# Patient Record
Sex: MALE | Race: WHITE | Employment: FULL TIME | URBAN - METROPOLITAN AREA
[De-identification: names, ages, dates, MRNs, and addresses within clinical notes are randomized per-mention and may not be internally consistent; named-entity substitution may affect disease eponyms.]

---

## 2017-09-14 PROBLEM — I63.9 ACUTE ISCHEMIC STROKE (HCC): Status: ACTIVE | Noted: 2017-09-14

## 2017-09-21 ENCOUNTER — OFFICE VISIT (OUTPATIENT)
Dept: INTERNAL MEDICINE | Age: 52
End: 2017-09-21
Attending: STUDENT IN AN ORGANIZED HEALTH CARE EDUCATION/TRAINING PROGRAM

## 2017-09-21 VITALS
BODY MASS INDEX: 27.88 KG/M2 | HEART RATE: 73 BPM | RESPIRATION RATE: 16 BRPM | SYSTOLIC BLOOD PRESSURE: 180 MMHG | DIASTOLIC BLOOD PRESSURE: 93 MMHG | OXYGEN SATURATION: 99 % | WEIGHT: 188.8 LBS

## 2017-09-21 DIAGNOSIS — I10 ESSENTIAL HYPERTENSION: ICD-10-CM

## 2017-09-21 DIAGNOSIS — E78.00 PURE HYPERCHOLESTEROLEMIA: ICD-10-CM

## 2017-09-21 DIAGNOSIS — I63.9 CEREBROVASCULAR ACCIDENT (CVA), UNSPECIFIED MECHANISM (HCC): Primary | ICD-10-CM

## 2017-09-21 RX ORDER — AMLODIPINE BESYLATE 10 MG/1
10 TABLET ORAL DAILY
Qty: 30 TABLET | Refills: 3 | Status: SHIPPED | OUTPATIENT
Start: 2017-09-21 | End: 2018-01-24 | Stop reason: SDUPTHER

## 2017-09-27 ENCOUNTER — OFFICE VISIT (OUTPATIENT)
Dept: CARDIOLOGY CLINIC | Age: 52
End: 2017-09-27

## 2017-09-27 VITALS
BODY MASS INDEX: 28.09 KG/M2 | SYSTOLIC BLOOD PRESSURE: 132 MMHG | DIASTOLIC BLOOD PRESSURE: 90 MMHG | WEIGHT: 190.2 LBS | HEART RATE: 89 BPM

## 2017-09-27 DIAGNOSIS — I10 ESSENTIAL HYPERTENSION: ICD-10-CM

## 2017-09-27 DIAGNOSIS — I63.9 CEREBROVASCULAR ACCIDENT (CVA), UNSPECIFIED MECHANISM (HCC): ICD-10-CM

## 2017-09-27 DIAGNOSIS — I10 ESSENTIAL HYPERTENSION: Primary | ICD-10-CM

## 2017-09-27 LAB
ANION GAP SERPL CALCULATED.3IONS-SCNC: 17 MMOL/L (ref 3–16)
BUN BLDV-MCNC: 14 MG/DL (ref 7–20)
CALCIUM SERPL-MCNC: 10.5 MG/DL (ref 8.3–10.6)
CHLORIDE BLD-SCNC: 102 MMOL/L (ref 99–110)
CO2: 25 MMOL/L (ref 21–32)
CREAT SERPL-MCNC: 1.2 MG/DL (ref 0.9–1.3)
GFR AFRICAN AMERICAN: >60
GFR NON-AFRICAN AMERICAN: >60
GLUCOSE BLD-MCNC: 98 MG/DL (ref 70–99)
POTASSIUM SERPL-SCNC: 5.3 MMOL/L (ref 3.5–5.1)
SODIUM BLD-SCNC: 144 MMOL/L (ref 136–145)

## 2017-09-27 PROCEDURE — 99214 OFFICE O/P EST MOD 30 MIN: CPT | Performed by: NURSE PRACTITIONER

## 2017-09-27 PROCEDURE — 93000 ELECTROCARDIOGRAM COMPLETE: CPT | Performed by: NURSE PRACTITIONER

## 2017-09-28 DIAGNOSIS — I10 ESSENTIAL HYPERTENSION: Primary | ICD-10-CM

## 2017-10-05 ENCOUNTER — OFFICE VISIT (OUTPATIENT)
Dept: INTERNAL MEDICINE | Age: 52
End: 2017-10-05
Attending: STUDENT IN AN ORGANIZED HEALTH CARE EDUCATION/TRAINING PROGRAM

## 2017-10-05 VITALS
BODY MASS INDEX: 28.41 KG/M2 | SYSTOLIC BLOOD PRESSURE: 114 MMHG | OXYGEN SATURATION: 100 % | DIASTOLIC BLOOD PRESSURE: 78 MMHG | HEART RATE: 82 BPM | WEIGHT: 192.4 LBS | TEMPERATURE: 98.3 F | RESPIRATION RATE: 18 BRPM

## 2017-10-05 DIAGNOSIS — R47.01 EXPRESSIVE APHASIA: ICD-10-CM

## 2017-10-05 DIAGNOSIS — I10 ESSENTIAL HYPERTENSION: ICD-10-CM

## 2017-10-05 DIAGNOSIS — E87.5 HYPERKALEMIA: ICD-10-CM

## 2017-10-05 DIAGNOSIS — I63.512 CEREBRAL INFARCTION DUE TO OCCLUSION OF LEFT MIDDLE CEREBRAL ARTERY (HCC): Primary | ICD-10-CM

## 2017-10-05 LAB
ALBUMIN SERPL-MCNC: 4.2 G/DL (ref 3.4–5)
ANION GAP SERPL CALCULATED.3IONS-SCNC: 11 MMOL/L (ref 3–16)
BUN BLDV-MCNC: 30 MG/DL (ref 7–20)
CALCIUM SERPL-MCNC: 9.5 MG/DL (ref 8.3–10.6)
CHLORIDE BLD-SCNC: 102 MMOL/L (ref 99–110)
CO2: 26 MMOL/L (ref 21–32)
CREAT SERPL-MCNC: 1.5 MG/DL (ref 0.9–1.3)
GFR AFRICAN AMERICAN: 59
GFR NON-AFRICAN AMERICAN: 49
GLUCOSE BLD-MCNC: 109 MG/DL (ref 70–99)
GLUCOSE BLD-MCNC: 119 MG/DL (ref 70–99)
PERFORMED ON: ABNORMAL
PHOSPHORUS: 3.3 MG/DL (ref 2.5–4.9)
POTASSIUM SERPL-SCNC: 4.9 MMOL/L (ref 3.5–5.1)
SODIUM BLD-SCNC: 139 MMOL/L (ref 136–145)

## 2017-10-05 NOTE — MR AVS SNAPSHOT
After Visit Summary             Lora Graham   10/5/2017 8:45 AM   Office Visit    Description:  Male : 1965   Provider:  Diana eLonard MD; [facility]   Department:  [address] [place] [address] and Future Appointments         Below is a list of your follow-up and future appointments. This may not be a complete list as you may have made appointments directly with providers that we are not aware of or your providers may have made some for you. Please call your providers to confirm appointments. It is important to keep your appointments. Please bring your current insurance card, photo ID, co-pay, and all medication bottles to your appointment. If self-pay, payment is expected at the time of service. Your To-Do List     Future Appointments Provider Department Dept Phone    2017 8:45 AM Diana Leonard MD; [address] [phone]         Information from Your Visit        Department     Name Address Phone Fax    [address] [address] SONALI Rogers. [facility] [id number] [phone]      You Were Seen for:         Comments    Cerebral infarction due to occlusion of left middle cerebral artery [facility])   [[id number]]         Vital Signs     Blood Pressure Pulse Temperature Respirations Weight Oxygen Saturation    114/78 (Site: Right Arm, Position: Sitting, Cuff Size: Medium Adult) 82 98.3 °F (36.8 °C) (Oral) 18 192 lb 6.4 oz (87.3 kg) 100%    Body Mass Index Smoking Status                28.41 kg/m2 Never Smoker          Additional Information about your Body Mass Index (BMI)           Your BMI as listed above is considered overweight (25.0-29.9). BMI is an estimate of body fat, calculated from your height and weight.   The higher your BMI, the greater your risk of heart disease, high blood pressure, type 2 diabetes, stroke, gallstones, arthritis, sleep apnea, and certain cancers. BMI is not perfect. It may overestimate body fat in athletes and people who are more muscular. If your body fat is high you can improve your BMI by decreasing your calorie intake and becoming more physically active. Learn more at: [email or web address]          Instructions    Renal panel. Continue all medications the same. Return in 2 months. TO ER BY AMBULANCE              Medications and Orders      Your Current Medications Are              amLODIPine (NORVASC) 10 MG tablet Take 1 tablet by mouth daily    aspirin 81 MG EC tablet Take 1 tablet by mouth daily    atorvastatin (LIPITOR) 40 MG tablet Take 1 tablet by mouth nightly    lisinopril (PRINIVIL;ZESTRIL) 40 MG tablet Take 1 tablet by mouth daily    clopidogrel (PLAVIX) 75 MG tablet Take 1 tablet by mouth daily      Allergies           No Known Allergies      We Ordered/Performed the following           POCT Glucose     RENAL FUNCTION PANEL          Result Summary for RENAL FUNCTION PANEL      Result Information       Status          Abnormal Final result (Collected: 10/5/2017 10:31 AM)           10/5/2017 10:57 AM      Component Results     Component Value Ref Range & Units Status    Sodium 139 136 - 145 mmol/L Final    Potassium 4.9 3.5 - 5.1 mmol/L Final    Specimen hemolysis has exceeded the interference as defined by Roche. Value may be falsely increased. Suggest recollection if clinically  indicated. Chloride 102 99 - 110 mmol/L Final    CO2 26 21 - 32 mmol/L Final    Anion Gap 11 3 - 16 Final    Glucose 109 (H) 70 - 99 mg/dL Final    BUN 30 (H) 7 - 20 mg/dL Final    CREATININE 1.5 (H) 0.9 - 1.3 mg/dL Final    GFR Non- 49 (A) >60 Final    >60 mL/min/1.73m2 EGFR, calc. for ages 25 and older using the  MDRD formula (not corrected for weight), is valid for stable  renal function. GFR  59 (A) >60 Final    Chronic Kidney Disease: less than 60 ml/min/1.73 sq.m. Kidney Failure: less than 15 ml/min/1.73 sq.m. Results valid for patients 18 years and older. Calcium 9.5 8.3 - 10.6 mg/dL Final    Phosphorus 3.3 2.5 - 4.9 mg/dL Final    Alb 4.2 3.4 - 5.0 g/dL Final      Narrative           Performed at: [facility] [employer] [facility]  [address] [facility] [address]   Phone [phone]         Result Summary for POCT Glucose      Result Information       Status          Abnormal Final result (Collected: 10/5/2017  9:29 AM)           10/5/2017  9:31 AM      Component Results     Component Value Ref Range & Units Status    POC Glucose 119 (H) 70 - 99 mg/dl Final    Performed on ACCU-CHEK  Final      Narrative           Performed at: [facility]  [address] [address]   Phone [phone]               Additional Information        Basic Information     Date Of Birth Sex Race Ethnicity Preferred Language    1965 Male White Non-/Non  English      Problem List as of 10/5/2017  Date Reviewed: 9/27/2017                Noncompliance    Acute ischemic stroke [facility])    Hypertensive emergency    Cerebral infarction ([facility] [address]    Slurred speech    Expressive aphasia    HTN (hypertension)    Incoordination of extremity    Leukocytosis      Preventive Care        Date Due    Hepatitis C screening is recommended for all adults regardless of risk factors born between [facility] at least once (lifetime) who have never been tested. 1965    HIV screening is recommended for all people regardless of risk factors  aged 15-65 years at least once (lifetime) who have never been HIV tested.  11/10/1979    Tetanus Combination Vaccine (1 - Tdap) 11/10/1983    Colonoscopy 11/10/2014    Yearly Flu Vaccine (1) 10/4/2018 (Originally 9/1/2017)    Diabetes Screening 9/13/2020    Cholesterol Screening 9/13/2022            [facility] Signup Streamup allows you to send messages to your doctor, view your test results, renew your prescriptions, schedule appointments, view visit notes, and more. How Do I Sign Up? 1. In your Internet browser, go to [email or web address]  2. Click on the Sign Up Now link in the Sign In box. You will see the New Member Sign Up page. 3. Enter your Streamup Access Code exactly as it appears below. You will not need to use this code after youve completed the sign-up process. If you do not sign up before the expiration date, you must request a new code. Streamup Access Code: [email or web address]  Expires: 11/16/2017  1:24 PM    4. Enter your Social Security Number ([id number]) and Date of Birth (mm/dd/yyyy) as indicated and click Submit. You will be taken to the next sign-up page. 5. Create a Streamup ID. This will be your Streamup login ID and cannot be changed, so think of one that is secure and easy to remember. 6. Create a Streamup password. You can change your password at any time. 7. Enter your Password Reset Question and Answer. This can be used at a later time if you forget your password. 8. Enter your e-mail address. You will receive e-mail notification when new information is available in [address]. 9. Click Sign Up. You can now view your medical record. Additional Information  If you have questions, please contact the physician practice where you receive care. Remember, Streamup is NOT to be used for urgent needs. For medical emergencies, dial 911. For questions regarding your Streamup account call [phone]. If you have a clinical question, please call your doctor's office.

## 2017-10-09 ENCOUNTER — OFFICE VISIT (OUTPATIENT)
Dept: INTERNAL MEDICINE | Age: 52
End: 2017-10-09
Attending: STUDENT IN AN ORGANIZED HEALTH CARE EDUCATION/TRAINING PROGRAM

## 2017-10-09 VITALS
OXYGEN SATURATION: 98 % | SYSTOLIC BLOOD PRESSURE: 131 MMHG | TEMPERATURE: 98.7 F | HEIGHT: 69 IN | DIASTOLIC BLOOD PRESSURE: 86 MMHG | RESPIRATION RATE: 16 BRPM | HEART RATE: 82 BPM | BODY MASS INDEX: 28.73 KG/M2 | WEIGHT: 194 LBS

## 2017-10-09 DIAGNOSIS — N17.9 AKI (ACUTE KIDNEY INJURY) (HCC): Primary | ICD-10-CM

## 2017-10-09 LAB
ALBUMIN SERPL-MCNC: 4.3 G/DL (ref 3.4–5)
ANION GAP SERPL CALCULATED.3IONS-SCNC: 12 MMOL/L (ref 3–16)
BUN BLDV-MCNC: 14 MG/DL (ref 7–20)
CALCIUM SERPL-MCNC: 9.8 MG/DL (ref 8.3–10.6)
CHLORIDE BLD-SCNC: 99 MMOL/L (ref 99–110)
CO2: 27 MMOL/L (ref 21–32)
CREAT SERPL-MCNC: 1.2 MG/DL (ref 0.9–1.3)
GFR AFRICAN AMERICAN: >60
GFR NON-AFRICAN AMERICAN: >60
GLUCOSE BLD-MCNC: 78 MG/DL (ref 70–99)
PHOSPHORUS: 2.9 MG/DL (ref 2.5–4.9)
POTASSIUM SERPL-SCNC: 4 MMOL/L (ref 3.5–5.1)
SODIUM BLD-SCNC: 138 MMOL/L (ref 136–145)

## 2017-10-09 NOTE — PROGRESS NOTES
[facility] PROGRESS NOTE      October 9, 2017  Valentino Lye    Chief Complaint:   Chief Complaint   Patient presents with    Follow-up     B/P check       Constitutional: alert and oriented; calm; states he feels well since receiving fluids in hospital last week; states he has needle phobia and passed out while having blood drawn last week. Eyes: negative  Ears, nose, mouth, throat, and face: negative  Respiratory: negative  Cardiovascular: denies feeling lightheaded or dizzy with position changes  Gastrointestinal: negative  States he has been eating and drinking well.   Genitourinary: negative  Integument/breast: negative  Hematologic/lymphatic: negative  Musculoskeletal: negative  Neurological: negative  Diabetes: No      Pain Assessment:  Pain Present: no  Pain Score: 0  Pain Quality/Description: no c/o pain    Mobility/Safety/Self-Care:  Steady Gait: Yes  History of Falls: passed out last week during phlebotomy   History of a Fall within the last 30 days No  Assistive Device: None  Poor Hygiene: No    Psychosocial:   Depression: No  If YES,    Does Patient express current thoughts of harming self or others?: No  Anxious: No  Insomnia: No  Inappropriate Behavior: No  Alcohol Abuse: no  Substance Abuse: unknown  Signs of Physical Abuse: No  Signs of Emotional Abuse: No    Educational:  Identify the learner who is being assessed for education:  patient                    Ability to Learn:  Exhibits ability to grasp concepts and respond to questions: High  Ready to Learn: Yes  calm   Preferred Method of Learning:  written  Barriers to Learning: Verbalizes interest  Special Considerations due to cultural, Gnosticist, spiritual beliefs:  No  Language:  English  :  No    Note:   wife with patient      9:52 AM 10/9/2017

## 2017-10-09 NOTE — PATIENT INSTRUCTIONS
Call your pharmacy for medication refills at least 48 hours ahead at [phone] (Monday -Friday, 08:00 AM to 4:00 PM .If you become ill when the clinic is closed, please call [facility]  at [phone] and ask the  to page the AOD between 6:00 AM and 6:00 PM or page the AON between 6:00 PM & 6:00 AM.    The clinic is not able to process [facility] requests for appointments or messages including refill requests. Please call the [facility] [employer] [facility] [address] [employer] at [phone] for appointments and messages. The [facility] [employer] [facility] [address]  Telephone:[phone]    Renal function bloodwork done today. New prescription for Beta blocker:     Metoprolol      Do not fill or start this medication until  calls you with the results of the bloodwork. Also do not take the Lisinopril until you know the results of the bloodwork. Return next week to see     Instructions reviewed with patient and copy given to patient upon discharge.      10:39 AM10/9/2017

## 2017-10-09 NOTE — PROGRESS NOTES
Outpatient Clinic Visit Note    Patient: Lisa Garcia  : 1965 (45 y.o.)  Date: 10/9/2017    CC: Syncope    HPI:    Mr Rosas Aragon is a 46 yo gentleman is following up after experncing what appears to be a convulsive vasovagal episode last week in clinic. Initially some concern for seizure and was sent to ED. He was seen in the ED and was discharged after being rehydrated (2L IV of NS), other than elevated creatinine no acute findings were noted. CT head was negative for acute changes. Past Medical History:    Past Medical History:   Diagnosis Date    Back pain     lower back. pinched nerve and \"slipped disc\"    Cerebral artery occlusion with cerebral infarction ([address]     Hypertension        Past Surgical History:  Past Surgical History:   Procedure Laterality Date    FRACTURE SURGERY      left arm       Home Medications:  Has been reviewed and as documented. Allergies:    Review of patient's allergies indicates no known allergies. Family History:   No family history on file. ROS: A 10-organ Review Of Systems was obtained and otherwise unremarkable except as per HPI. PHYSICAL EXAM:  /86 (Site: Right Arm, Position: Standing, Cuff Size: Large Adult) Comment: denies lightheadedness  Pulse 82   Temp 98.7 °F (37.1 °C) (Oral)   Resp 16   Ht 5' 9\" (1.753 m)   Wt 194 lb (88 kg)   SpO2 98%   BMI 28.65 kg/m²      Physical Exam  General: Patient conversant, appears comfortable, no acute distress  HEENT: PERRL; EOMI; moist mucous membranes  Heart: RRR; Normal S1, S2; no murmurs, rubs, or gallops  Lungs: CTAB; no wheezing or crackles; normal respiratory effort  Abdomen: Soft; non-tender; non-distended. MSK: Muscle strength grossly intact.    Vascular: Radial and DP pulses 2+ and symmetric  Skin: No rash, No jaundice  NEURO:   CNs II-XII grossly intact without obvious deficit   Somewhat slowed speech, but fluent or word findings issues   Slow gait   Brisk (2+) reflexes elicited biceps brachialis, triceps, and patellar without clonus   Sensation grossly intact to light touch   Minimal dysmetria more pronounced in left hand   Muscle strength 5/5 in all extremities    Assessment & Plan:      Convulsive Vasovagal episode. Last week while getting blood drawn became hypotensive and bradycardic. Also experienced some shaking. Send to ED and noted to be also dehydrated with elevated creatinine. Reportedly has previous history of vasovagal episodes with \"needles\". Seizure less likely given findings. Glucose was WNL. - Repeat BP  - Repeat Renal Panel     #S/P CVA  - Cont Plavix, ASA, Statin for now. Per discharge paperwork Plavix should be continued for 30 days.   - Aggressive risk modification  - Handicap placard    #Essential Hypertension. 153/90 lying, 134/78 sitting, 131/86 standing. Home blood pressure measurement also range from low 100s to 150s. Denies lightheadedness, tachycardia or dizziness when changing position.  - Hold Lisinopril as previously had POC Cr elevation to 1.7 in ED suggestive of MAYUR. Re-check renal panel, restart if back to baseline creatinine. Will switch to metoprolol 25 BID if creatinine still elevated.    - Cont Norvasc 10mg  - Has f/u with nephrology scheduled in November for secondary HTN w/u, HTN management - appreciate their input  - Cont to check BP at home and bring it o all appointments    #Hyperlipidemia  Cont Lipitor    #Hyperkalemia - resolved  In ED POC venous 4.1 in ED, renal panel from 10/5 was 4.9 (but hemolyzed)  - Will recheck renal today     #Health Maintenance  Will need colonoscopy, Hep C screen, HIV screen, possible Tdap moving forward  F/u with PCP next available appointment    Dispo: Pt has been staffed with Dr. Susana Hutchison  _______________  Salvador Matson MD 10/9/2017 10:21 AM   PGY2 Internal Medicine  Pager: [phone]

## 2017-10-10 ENCOUNTER — TELEPHONE (OUTPATIENT)
Dept: INTERNAL MEDICINE | Age: 52
End: 2017-10-10

## 2017-10-10 NOTE — TELEPHONE ENCOUNTER
Spoke with patient's girlfriend, Gandarahever Romero. Patient to restart Lisinopril. Patient to told betablocker. Juliocesar Romero understands instructions.

## 2018-01-24 DIAGNOSIS — I63.9 CEREBROVASCULAR ACCIDENT (CVA), UNSPECIFIED MECHANISM (HCC): ICD-10-CM

## 2018-01-24 DIAGNOSIS — I10 ESSENTIAL HYPERTENSION: ICD-10-CM

## 2018-01-24 RX ORDER — AMLODIPINE BESYLATE 10 MG/1
10 TABLET ORAL DAILY
Qty: 30 TABLET | Refills: 0 | Status: ON HOLD | OUTPATIENT
Start: 2018-01-24 | End: 2022-04-19

## 2022-04-18 ENCOUNTER — APPOINTMENT (OUTPATIENT)
Dept: MRI IMAGING | Age: 57
DRG: 149 | End: 2022-04-18

## 2022-04-18 ENCOUNTER — HOSPITAL ENCOUNTER (INPATIENT)
Age: 57
LOS: 1 days | Discharge: HOME | DRG: 149 | End: 2022-04-19
Attending: EMERGENCY MEDICINE | Admitting: INTERNAL MEDICINE

## 2022-04-18 ENCOUNTER — APPOINTMENT (OUTPATIENT)
Dept: CT IMAGING | Age: 57
DRG: 149 | End: 2022-04-18

## 2022-04-18 DIAGNOSIS — N17.9 AKI (ACUTE KIDNEY INJURY) (HCC): ICD-10-CM

## 2022-04-18 DIAGNOSIS — I10 ESSENTIAL HYPERTENSION: ICD-10-CM

## 2022-04-18 DIAGNOSIS — R42 VERTIGO: Primary | ICD-10-CM

## 2022-04-18 DIAGNOSIS — I10 UNCONTROLLED HYPERTENSION: ICD-10-CM

## 2022-04-18 DIAGNOSIS — I63.9 CEREBROVASCULAR ACCIDENT (CVA), UNSPECIFIED MECHANISM (HCC): ICD-10-CM

## 2022-04-18 LAB
ANION GAP SERPL CALCULATED.3IONS-SCNC: 7 MMOL/L (ref 3–16)
BASOPHILS ABSOLUTE: 0.1 K/UL (ref 0–0.2)
BASOPHILS RELATIVE PERCENT: 1 %
BILIRUBIN URINE: NEGATIVE
BLOOD, URINE: ABNORMAL
BUN BLDV-MCNC: 18 MG/DL (ref 7–20)
CALCIUM SERPL-MCNC: 10.1 MG/DL (ref 8.3–10.6)
CHLORIDE BLD-SCNC: 101 MMOL/L (ref 99–110)
CLARITY: CLEAR
CO2: 25 MMOL/L (ref 21–32)
COLOR: YELLOW
CREAT SERPL-MCNC: 1.8 MG/DL (ref 0.9–1.3)
EKG ATRIAL RATE: 53 BPM
EKG DIAGNOSIS: NORMAL
EKG P AXIS: 39 DEGREES
EKG P-R INTERVAL: 150 MS
EKG Q-T INTERVAL: 448 MS
EKG QRS DURATION: 86 MS
EKG QTC CALCULATION (BAZETT): 420 MS
EKG R AXIS: 36 DEGREES
EKG T AXIS: -44 DEGREES
EKG VENTRICULAR RATE: 53 BPM
EOSINOPHILS ABSOLUTE: 0.4 K/UL (ref 0–0.6)
EOSINOPHILS RELATIVE PERCENT: 3.7 %
EPITHELIAL CELLS, UA: ABNORMAL /HPF (ref 0–5)
GFR AFRICAN AMERICAN: 47
GFR NON-AFRICAN AMERICAN: 39
GLUCOSE BLD-MCNC: 117 MG/DL (ref 70–99)
GLUCOSE URINE: NEGATIVE MG/DL
HCT VFR BLD CALC: 43.8 % (ref 40.5–52.5)
HEMOGLOBIN: 14.5 G/DL (ref 13.5–17.5)
HYALINE CASTS: ABNORMAL /LPF (ref 0–2)
KETONES, URINE: NEGATIVE MG/DL
LEUKOCYTE ESTERASE, URINE: NEGATIVE
LYMPHOCYTES ABSOLUTE: 2.1 K/UL (ref 1–5.1)
LYMPHOCYTES RELATIVE PERCENT: 21.9 %
MCH RBC QN AUTO: 28.5 PG (ref 26–34)
MCHC RBC AUTO-ENTMCNC: 33 G/DL (ref 31–36)
MCV RBC AUTO: 86.4 FL (ref 80–100)
MICROSCOPIC EXAMINATION: YES
MONOCYTES ABSOLUTE: 0.6 K/UL (ref 0–1.3)
MONOCYTES RELATIVE PERCENT: 6.3 %
MUCUS: ABNORMAL /LPF
NEUTROPHILS ABSOLUTE: 6.5 K/UL (ref 1.7–7.7)
NEUTROPHILS RELATIVE PERCENT: 67.1 %
NITRITE, URINE: NEGATIVE
PDW BLD-RTO: 14.2 % (ref 12.4–15.4)
PH UA: 6 (ref 5–8)
PLATELET # BLD: 193 K/UL (ref 135–450)
PMV BLD AUTO: 11.2 FL (ref 5–10.5)
POTASSIUM REFLEX MAGNESIUM: 4.3 MMOL/L (ref 3.5–5.1)
PROTEIN UA: 100 MG/DL
RBC # BLD: 5.07 M/UL (ref 4.2–5.9)
RBC UA: ABNORMAL /HPF (ref 0–4)
SODIUM BLD-SCNC: 133 MMOL/L (ref 136–145)
SODIUM URINE: 116 MMOL/L
SPECIFIC GRAVITY UA: >=1.03 (ref 1–1.03)
URINE REFLEX TO CULTURE: YES
URINE TYPE: ABNORMAL
UROBILINOGEN, URINE: 0.2 E.U./DL
WBC # BLD: 9.7 K/UL (ref 4–11)
WBC UA: ABNORMAL /HPF (ref 0–5)

## 2022-04-18 PROCEDURE — 2580000003 HC RX 258

## 2022-04-18 PROCEDURE — 6370000000 HC RX 637 (ALT 250 FOR IP): Performed by: EMERGENCY MEDICINE

## 2022-04-18 PROCEDURE — 85025 COMPLETE CBC W/AUTO DIFF WBC: CPT

## 2022-04-18 PROCEDURE — 93005 ELECTROCARDIOGRAM TRACING: CPT | Performed by: EMERGENCY MEDICINE

## 2022-04-18 PROCEDURE — 70450 CT HEAD/BRAIN W/O DYE: CPT

## 2022-04-18 PROCEDURE — 82570 ASSAY OF URINE CREATININE: CPT

## 2022-04-18 PROCEDURE — 80048 BASIC METABOLIC PNL TOTAL CA: CPT

## 2022-04-18 PROCEDURE — 1200000000 HC SEMI PRIVATE

## 2022-04-18 PROCEDURE — 81001 URINALYSIS AUTO W/SCOPE: CPT

## 2022-04-18 PROCEDURE — 70551 MRI BRAIN STEM W/O DYE: CPT

## 2022-04-18 PROCEDURE — 36415 COLL VENOUS BLD VENIPUNCTURE: CPT

## 2022-04-18 PROCEDURE — 6370000000 HC RX 637 (ALT 250 FOR IP)

## 2022-04-18 PROCEDURE — 99284 EMERGENCY DEPT VISIT MOD MDM: CPT

## 2022-04-18 PROCEDURE — 84300 ASSAY OF URINE SODIUM: CPT

## 2022-04-18 PROCEDURE — 6360000002 HC RX W HCPCS

## 2022-04-18 RX ORDER — MECLIZINE HYDROCHLORIDE 25 MG/1
25 TABLET ORAL ONCE
Status: COMPLETED | OUTPATIENT
Start: 2022-04-18 | End: 2022-04-18

## 2022-04-18 RX ORDER — NIFEDIPINE 30 MG/1
30 TABLET, FILM COATED, EXTENDED RELEASE ORAL 3 TIMES DAILY
COMMUNITY
Start: 2022-02-11

## 2022-04-18 RX ORDER — ACETAMINOPHEN 325 MG/1
650 TABLET ORAL EVERY 6 HOURS PRN
Status: DISCONTINUED | OUTPATIENT
Start: 2022-04-18 | End: 2022-04-19 | Stop reason: HOSPADM

## 2022-04-18 RX ORDER — ACETAMINOPHEN 650 MG/1
650 SUPPOSITORY RECTAL EVERY 6 HOURS PRN
Status: DISCONTINUED | OUTPATIENT
Start: 2022-04-18 | End: 2022-04-19 | Stop reason: HOSPADM

## 2022-04-18 RX ORDER — METOPROLOL SUCCINATE 50 MG/1
50 TABLET, EXTENDED RELEASE ORAL DAILY
Status: CANCELLED | OUTPATIENT
Start: 2022-04-18

## 2022-04-18 RX ORDER — HEPARIN SODIUM 5000 [USP'U]/ML
5000 INJECTION, SOLUTION INTRAVENOUS; SUBCUTANEOUS EVERY 8 HOURS SCHEDULED
Status: DISCONTINUED | OUTPATIENT
Start: 2022-04-18 | End: 2022-04-19 | Stop reason: HOSPADM

## 2022-04-18 RX ORDER — ONDANSETRON 2 MG/ML
4 INJECTION INTRAMUSCULAR; INTRAVENOUS EVERY 6 HOURS PRN
Status: DISCONTINUED | OUTPATIENT
Start: 2022-04-18 | End: 2022-04-19 | Stop reason: HOSPADM

## 2022-04-18 RX ORDER — SODIUM CHLORIDE 9 MG/ML
INJECTION, SOLUTION INTRAVENOUS PRN
Status: DISCONTINUED | OUTPATIENT
Start: 2022-04-18 | End: 2022-04-19 | Stop reason: HOSPADM

## 2022-04-18 RX ORDER — SODIUM CHLORIDE 0.9 % (FLUSH) 0.9 %
5-40 SYRINGE (ML) INJECTION EVERY 12 HOURS SCHEDULED
Status: DISCONTINUED | OUTPATIENT
Start: 2022-04-18 | End: 2022-04-19 | Stop reason: HOSPADM

## 2022-04-18 RX ORDER — NEBIVOLOL 10 MG/1
10 TABLET ORAL DAILY
COMMUNITY
Start: 2022-02-11

## 2022-04-18 RX ORDER — POLYETHYLENE GLYCOL 3350 17 G/17G
17 POWDER, FOR SOLUTION ORAL DAILY PRN
Status: DISCONTINUED | OUTPATIENT
Start: 2022-04-18 | End: 2022-04-19 | Stop reason: HOSPADM

## 2022-04-18 RX ORDER — SODIUM CHLORIDE 0.9 % (FLUSH) 0.9 %
5-40 SYRINGE (ML) INJECTION PRN
Status: DISCONTINUED | OUTPATIENT
Start: 2022-04-18 | End: 2022-04-19 | Stop reason: HOSPADM

## 2022-04-18 RX ORDER — PROMETHAZINE HYDROCHLORIDE 12.5 MG/1
12.5 TABLET ORAL EVERY 6 HOURS PRN
Status: DISCONTINUED | OUTPATIENT
Start: 2022-04-18 | End: 2022-04-19 | Stop reason: HOSPADM

## 2022-04-18 RX ORDER — SPIRONOLACTONE 50 MG/1
50 TABLET, FILM COATED ORAL DAILY
Status: DISCONTINUED | OUTPATIENT
Start: 2022-04-18 | End: 2022-04-19 | Stop reason: HOSPADM

## 2022-04-18 RX ORDER — NIFEDIPINE 30 MG/1
90 TABLET, FILM COATED, EXTENDED RELEASE ORAL DAILY
Status: DISCONTINUED | OUTPATIENT
Start: 2022-04-18 | End: 2022-04-19 | Stop reason: HOSPADM

## 2022-04-18 RX ORDER — ATORVASTATIN CALCIUM 40 MG/1
40 TABLET, FILM COATED ORAL NIGHTLY
Status: DISCONTINUED | OUTPATIENT
Start: 2022-04-18 | End: 2022-04-19 | Stop reason: HOSPADM

## 2022-04-18 RX ORDER — ASPIRIN 81 MG/1
81 TABLET ORAL DAILY
Status: DISCONTINUED | OUTPATIENT
Start: 2022-04-18 | End: 2022-04-19 | Stop reason: HOSPADM

## 2022-04-18 RX ORDER — ROSUVASTATIN CALCIUM 10 MG/1
10 TABLET, COATED ORAL DAILY
Status: ON HOLD | COMMUNITY
Start: 2022-02-11 | End: 2022-04-19

## 2022-04-18 RX ADMIN — ATORVASTATIN CALCIUM 40 MG: 40 TABLET, FILM COATED ORAL at 23:12

## 2022-04-18 RX ADMIN — NIFEDIPINE 90 MG: 30 TABLET, EXTENDED RELEASE ORAL at 15:33

## 2022-04-18 RX ADMIN — HEPARIN SODIUM 5000 UNITS: 5000 INJECTION INTRAVENOUS; SUBCUTANEOUS at 17:15

## 2022-04-18 RX ADMIN — SODIUM CHLORIDE, PRESERVATIVE FREE 10 ML: 5 INJECTION INTRAVENOUS at 22:52

## 2022-04-18 RX ADMIN — MECLIZINE HYDROCHLORIDE 25 MG: 25 TABLET ORAL at 10:23

## 2022-04-18 RX ADMIN — ASPIRIN 81 MG: 81 TABLET, COATED ORAL at 15:33

## 2022-04-18 ASSESSMENT — PAIN SCALES - GENERAL
PAINLEVEL_OUTOF10: 0

## 2022-04-18 ASSESSMENT — ENCOUNTER SYMPTOMS
VOMITING: 0
ABDOMINAL PAIN: 0
NAUSEA: 1
DIARRHEA: 0
SHORTNESS OF BREATH: 0

## 2022-04-18 NOTE — ED PROVIDER NOTES
[address] [facility]          ATTENDING PHYSICIAN NOTE       Date of evaluation: 4/18/2022    Chief Complaint     Dizziness and Nausea      History of Present Illness     Ap Griffin is a 61 y.o. male who presents with complaints of waking up this morning with a sensation that the room was spinning. The patient had a hard time ambulating and became nauseous as well. He denies any headache or visual changes. He is concerned because he has a history of prior strokes and thought that this may represent a symptom of a stroke. With the patient's prior strokes, he had a visual disturbance as well as speech disturbance. The patient states that the vertigo sensation is actually getting better as time goes on today. He denies any chest pain or palpitations. The patient also notes that his blood pressure has been up since waking up this morning as well and he had some changes made to his blood pressure regimen about 3 weeks ago at his primary care physician's office. He reports now that he is only taking 1 medication but there are multiple other medications listed in his chart. He appears to have been on aspirin, Plavix, and a statin in the past but has not reporting that he is taking these medications. He is only taking one of his blood pressure medications as well. Review of Systems     Review of Systems   Constitutional: Negative for chills and fever. Eyes: Negative for visual disturbance. Respiratory: Negative for shortness of breath. Cardiovascular: Negative for chest pain and palpitations. Gastrointestinal: Positive for nausea. Negative for abdominal pain, diarrhea and vomiting. Neurological: Positive for dizziness. Negative for syncope, speech difficulty, weakness, light-headedness, numbness and headaches. All other systems reviewed and are negative.       Past Medical, Surgical, Family, and Social History     He has a past medical history of Back pain, Cerebral artery occlusion with cerebral infarction ([facility] [address] CKD (chronic kidney disease) stage 3, GFR 30-59 ml/min ([facility]), Hyperlipidemia, mixed, and Hypertension. He has a past surgical history that includes fracture surgery and Colonoscopy (10/2010). His family history includes Cancer in his father and mother; Hypertension in his brother, father, mother, and sister. He reports that he has never smoked. He has never used smokeless tobacco. He reports that he does not drink alcohol and does not use drugs. Medications     Previous Medications    AMLODIPINE (NORVASC) 10 MG TABLET    Take 1 tablet by mouth daily Must be seen before we will dispense more refills. ASPIRIN 81 MG EC TABLET    Take 1 tablet by mouth daily    ATORVASTATIN (LIPITOR) 40 MG TABLET    Take 1 tablet by mouth nightly    CLOPIDOGREL (PLAVIX) 75 MG TABLET    Take 1 tablet by mouth daily    LISINOPRIL (PRINIVIL;ZESTRIL) 40 MG TABLET    Take 1 tablet by mouth daily    NEBIVOLOL (BYSTOLIC) 10 MG TABLET    Take 10 mg by mouth daily    NIFEDIPINE (ADALAT CC) 30 MG EXTENDED RELEASE TABLET    Take 30 mg by mouth 3 times daily    ROSUVASTATIN (CRESTOR) 10 MG TABLET    Take 10 mg by mouth daily       Allergies     He has No Known Allergies. Physical Exam     INITIAL VITALS: BP: (!) 186/102, Temp: 97.9 °F (36.6 °C), Pulse: 61, Resp: 21, SpO2: 99 %   Physical Exam  Vitals and nursing note reviewed. Constitutional:       General: He is not in acute distress. Appearance: He is well-developed. He is not diaphoretic. HENT:      Head: Normocephalic. Eyes:      Extraocular Movements: Extraocular movements intact. Right eye: No nystagmus. Left eye: No nystagmus. Pupils: Pupils are equal, round, and reactive to light. Cardiovascular:      Rate and Rhythm: Normal rate and regular rhythm. Pulmonary:      Effort: Pulmonary effort is normal.      Breath sounds: Normal breath sounds.    Abdominal:      General: Bowel sounds are normal. There is no distension. Palpations: Abdomen is soft. Tenderness: There is no abdominal tenderness. Skin:     General: Skin is warm and dry. Neurological:      Mental Status: He is alert and oriented to person, place, and time. Cranial Nerves: No cranial nerve deficit. Sensory: No sensory deficit. Motor: No weakness. Coordination: Coordination normal.      Gait: Gait abnormal.   Psychiatric:         Behavior: Behavior normal.         Diagnostic Results     EKG   Interpreted by Yolette Haji MD     Rhythm: normal sinus   Rate: normal  Axis: normal  Ectopy: none  Conduction: normal  ST Segments: no acute change  T Waves: Inversions inferiorly and laterally, known to be old  Q Waves: none    Clinical Impression: normal sinus rhythm with no acute changes, unspecific T wave inversion      RADIOLOGY:  CT HEAD WO CONTRAST   Final Result      1. No acute intracranial abnormality. 2. Extensive nonspecific periventricular white matter hypodensity compatible with chronic small vessel ischemic disease and/or age related degenerative change. 3. Bilateral thalamic hypodensities compatible with remote lacunar infarcts. 4. Large periapical lucency along the left maxillary incisor suggesting a large chronic periapical abscess. Similar findings present on previous exam from 2017.           LABS:   Results for orders placed or performed during the hospital encounter of 04/18/22   CBC with Auto Differential   Result Value Ref Range    WBC 9.7 4.0 - 11.0 K/uL    RBC 5.07 4.20 - 5.90 M/uL    Hemoglobin 14.5 13.5 - 17.5 g/dL    Hematocrit 43.8 40.5 - 52.5 %    MCV 86.4 80.0 - 100.0 fL    MCH 28.5 26.0 - 34.0 pg    MCHC 33.0 31.0 - 36.0 g/dL    RDW 14.2 12.4 - 15.4 %    Platelets 463 888 - 913 K/uL    MPV 11.2 (H) 5.0 - 10.5 fL    Neutrophils % 67.1 %    Lymphocytes % 21.9 %    Monocytes % 6.3 %    Eosinophils % 3.7 %    Basophils % 1.0 %    Neutrophils Absolute 6.5 1.7 - 7.7 K/uL    Lymphocytes Absolute 2.1 1.0 - 5.1 K/uL    Monocytes Absolute 0.6 0.0 - 1.3 K/uL    Eosinophils Absolute 0.4 0.0 - 0.6 K/uL    Basophils Absolute 0.1 0.0 - 0.2 K/uL   Basic Metabolic Panel w/ Reflex to MG   Result Value Ref Range    Sodium 133 (L) 136 - 145 mmol/L    Potassium reflex Magnesium 4.3 3.5 - 5.1 mmol/L    Chloride 101 99 - 110 mmol/L    CO2 25 21 - 32 mmol/L    Anion Gap 7 3 - 16    Glucose 117 (H) 70 - 99 mg/dL    BUN 18 7 - 20 mg/dL    CREATININE 1.8 (H) 0.9 - 1.3 mg/dL    GFR Non-African American 39 (A) >60    GFR  47 (A) >60    Calcium 10.1 8.3 - 10.6 mg/dL   EKG 12 Lead   Result Value Ref Range    Ventricular Rate 53 BPM    Atrial Rate 53 BPM    P-R Interval 150 ms    QRS Duration 86 ms    Q-T Interval 448 ms    QTc Calculation (Bazett) 420 ms    P Axis 39 degrees    R Axis 36 degrees    T Axis -44 degrees    Diagnosis       EKG performed in ER and to be interpreted by ER physician. Confirmed by MD, ER ([id number]),  Dannielle Espinoza ([id number]) on 4/18/2022 11:43:27 AM       RECENT VITALS:  BP: (!) 181/106,Temp: 97.9 °F (36.6 °C), Pulse: 55, Resp: 18, SpO2: (!) 88 %       ED Course     Nursing Notes, Past Medical Hx, Past Surgical Hx, Social Hx,Allergies, and Family Hx were reviewed. patient was given the following medications:  Orders Placed This Encounter   Medications    meclizine (ANTIVERT) tablet 25 mg       CONSULTS:  IP CONSULT TO HOSPITALIST    MEDICAL DECISIONMAKING / ASSESSMENT / Amy Settler is a 61 y.o. male presenting with acute onset of vertigo this morning upon awakening. Symptoms have gotten better over the course of the morning. He had no other concerning neurologic symptoms however given his prior history of stroke and the fact that he is not on any stroke prevention medications, a CT of the head was performed which showed no acute findings.   He was noted to be hypertensive here as well and this is likely due to the way he is taking his medications which sound to be not as they were prescribed. He was found to have MAYUR on lab analysis which is new for him as well and for these reasons I feel he needs to be brought in the hospital for further management of his blood pressure, treatment of vertigo, and treatment of MAYUR. Clinical Impression     1. Vertigo    2. MAYUR (acute kidney injury) ([facility] [address]    3.  Uncontrolled hypertension        Disposition     DISPOSITION Admitted 04/18/2022 12:54:14 PM       Jenny Medina MD  04/18/22 [id number]

## 2022-04-18 NOTE — H&P
further workup and management of MAYUR on CKD. Past Medical History:        Diagnosis Date    Back pain     lower back. pinched nerve and \"slipped disc\"    Cerebral artery occlusion with cerebral infarction ([facility] [address] 09/2017    left hemipons and posterior left basal ganglia    CKD (chronic kidney disease) stage 3, GFR 30-59 ml/min (HCC)     Hyperlipidemia, mixed     Hypertension        Past Surgical History:        Procedure Laterality Date    COLONOSCOPY  10/2010    Dr An Solano      left arm       Medications Priorto Admission:    Not in a hospital admission. Allergies:  Patient has no known allergies. Social History:    reports that he has never smoked. He has never used smokeless tobacco. He reports that he does not drink alcohol and does not use drugs. Family History:       Problem Relation Age of Onset    Cancer Mother         Colon    Hypertension Mother     Cancer Father         Lymphoma    Hypertension Father     Hypertension Sister     Hypertension Brother        Review of Systems    ROS: A 10 point review of systems was conducted, significant findings as noted in HPI. Physical exam:      Vitals:    04/18/22 1230   BP: (!) 181/106   Pulse: 55   Resp: 18   Temp:    SpO2: (!) 88%       Physical Exam  Constitutional:       Appearance: Normal appearance. He is obese. HENT:      Head: Normocephalic. Mouth/Throat:      Mouth: Mucous membranes are moist.   Eyes:      Extraocular Movements: Extraocular movements intact. Conjunctiva/sclera: Conjunctivae normal.      Pupils: Pupils are equal, round, and reactive to light. Cardiovascular:      Rate and Rhythm: Normal rate and regular rhythm. Pulses: Normal pulses. Heart sounds: Normal heart sounds. Pulmonary:      Effort: Pulmonary effort is normal.      Breath sounds: Normal breath sounds. Abdominal:      General: Abdomen is flat. Bowel sounds are normal.      Palpations: Abdomen is soft. Musculoskeletal:         General: Normal range of motion. Cervical back: Normal range of motion. Neurological:      General: No focal deficit present. Mental Status: He is alert and oriented to person, place, and time. Mental status is at baseline. Psychiatric:         Mood and Affect: Mood normal.         Behavior: Behavior normal.         DATA:    Labs:  CBC:   Recent Labs     04/18/22  1025   WBC 9.7   HGB 14.5   HCT 43.8          BMP:   Recent Labs     04/18/22  1025   *   K 4.3      CO2 25   BUN 18   CREATININE 1.8*   GLUCOSE 117*     LFT's: No results for input(s): AST, ALT, ALB, BILITOT, ALKPHOS in the last 72 hours. Troponin: No results for input(s): TROPONINI in the last 72 hours. BNP:No results for input(s): BNP in the last 72 hours. ABGs: No results for input(s): PHART, SSI9MAB, PO2ART in the last 72 hours. INR: No results for input(s): INR in the last 72 hours. U/A:No results for input(s): NITRITE, COLORU, PHUR, LABCAST, WBCUA, RBCUA, MUCUS, TRICHOMONAS, YEAST, BACTERIA, CLARITYU, SPECGRAV, LEUKOCYTESUR, UROBILINOGEN, BILIRUBINUR, BLOODU, GLUCOSEU, AMORPHOUS in the last 72 hours. Invalid input(s): KETONESU    CT HEAD WO CONTRAST   Final Result      1. No acute intracranial abnormality. 2. Extensive nonspecific periventricular white matter hypodensity compatible with chronic small vessel ischemic disease and/or age related degenerative change. 3. Bilateral thalamic hypodensities compatible with remote lacunar infarcts. 4. Large periapical lucency along the left maxillary incisor suggesting a large chronic periapical abscess. Similar findings present on previous exam from 2017. ASSESSMENT AND PLAN:  Padmini Benson is a 61 y.o. male with PMH as below notable for HTN, HLD, CKD stage 3 who presented with vertigo. Patient was admitted for further workup and management of MAYUR on CKD.      Vertigo 2/2 likely peripheral  Vertigo last >1 hour, unsteady walk. Resolved with meclizine, exacerbates with movement alleviates with rest. CT head did not show any acute intracranial abnormality. Bilateral thalamic hypodensities compatible with remote lacunar infarcts. Patient very hypertensive with history of hypertensive strokes. -F/u MRI to rule out stroke   -Continues telemetry    HTN  SBP levels between 180-190s. Patient was only taking Nebivolol   -Continue home Nebivolol  -Continue home nifedipine     MAYUR, most likely CKD   Cr 1.8, Baseline 1.5-1.7, BUN 18.  -F/u FeNa  -F/u UA   -f/u BMP  -F/u renal ultrasound  -F/u microalbumin/creatinine  -monitor lytes replace as needed  -avoid Nephrotoxic meds  -monitor I/Os strictly     Stroke history   Patient reported multiple strokes in 2017.  Patient was not taking statin or aspirin  -Start atorvastatin   -Start aspirin 81 mg     HLD  -Start atorvastatin        Will discuss with attending physician Dr. Stanislav Cancino    Code Status: Full code   FEN: Regular diet  PPX:  Heparin sc  DISPO: ARASH Joseph, PGY1  4/18/2022  1:00 PM

## 2022-04-18 NOTE — PROGRESS NOTES
Pt to room 6305 from ER. Alert and oriented x4. Denies chest pain, nausea, shortness of breath, or dizziness at this time. Afebrile. Family at bedside. Telemetry in place, sinus bradycardia. Will continue to monitor.

## 2022-04-18 NOTE — PROGRESS NOTES
4 Eyes Admission Assessment     I agree as the admission nurse that 2 RN's have performed a thorough Head to Toe Skin Assessment on the patient. ALL assessment sites listed below have been assessed on admission. Areas assessed by both nurses:   [x]   Head, Face, and Ears   [x]   Shoulders, Back, and Chest  [x]   Arms, Elbows, and Hands   [x]   Coccyx, Sacrum, and Ischum  [x]   Legs, Feet, and Heels        Does the Patient have Skin Breakdown?   No         Qamar Prevention initiated:  Yes   Wound Care Orders initiated:  No      [facility] nurse consulted for Pressure Injury (Stage 3,4, Unstageable, DTI, NWPT, and Complex wounds):  No      Nurse 1 eSignature: Electronically signed by Steph Estrada RN on 4/18/22 at 5:19 PM EDT    **SHARE this note so that the co-signing nurse is able to place an eSignature**    Nurse 2 eSignature: Electronically signed by Wilver Chilel RN on 4/18/22 at 5:45 PM EDT

## 2022-04-19 ENCOUNTER — APPOINTMENT (OUTPATIENT)
Dept: ULTRASOUND IMAGING | Age: 57
DRG: 149 | End: 2022-04-19

## 2022-04-19 VITALS
OXYGEN SATURATION: 95 % | WEIGHT: 216 LBS | DIASTOLIC BLOOD PRESSURE: 68 MMHG | HEIGHT: 69 IN | BODY MASS INDEX: 31.99 KG/M2 | SYSTOLIC BLOOD PRESSURE: 135 MMHG | TEMPERATURE: 98.1 F | HEART RATE: 64 BPM | RESPIRATION RATE: 18 BRPM

## 2022-04-19 LAB
ANION GAP SERPL CALCULATED.3IONS-SCNC: 13 MMOL/L (ref 3–16)
BASOPHILS ABSOLUTE: 0.1 K/UL (ref 0–0.2)
BASOPHILS RELATIVE PERCENT: 1.4 %
BUN BLDV-MCNC: 22 MG/DL (ref 7–20)
CALCIUM SERPL-MCNC: 9.7 MG/DL (ref 8.3–10.6)
CHLORIDE BLD-SCNC: 105 MMOL/L (ref 99–110)
CO2: 21 MMOL/L (ref 21–32)
CREAT SERPL-MCNC: 1.7 MG/DL (ref 0.9–1.3)
CREATININE URINE: 183.5 MG/DL (ref 39–259)
EOSINOPHILS ABSOLUTE: 0.6 K/UL (ref 0–0.6)
EOSINOPHILS RELATIVE PERCENT: 6.5 %
GFR AFRICAN AMERICAN: 50
GFR NON-AFRICAN AMERICAN: 42
GLUCOSE BLD-MCNC: 105 MG/DL (ref 70–99)
HCT VFR BLD CALC: 45.5 % (ref 40.5–52.5)
HEMOGLOBIN: 15.3 G/DL (ref 13.5–17.5)
LV EF: 58 %
LVEF MODALITY: NORMAL
LYMPHOCYTES ABSOLUTE: 3.2 K/UL (ref 1–5.1)
LYMPHOCYTES RELATIVE PERCENT: 34.3 %
MAGNESIUM: 2.2 MG/DL (ref 1.8–2.4)
MCH RBC QN AUTO: 28.9 PG (ref 26–34)
MCHC RBC AUTO-ENTMCNC: 33.8 G/DL (ref 31–36)
MCV RBC AUTO: 85.7 FL (ref 80–100)
MONOCYTES ABSOLUTE: 0.6 K/UL (ref 0–1.3)
MONOCYTES RELATIVE PERCENT: 6 %
NEUTROPHILS ABSOLUTE: 4.9 K/UL (ref 1.7–7.7)
NEUTROPHILS RELATIVE PERCENT: 51.8 %
PDW BLD-RTO: 14.4 % (ref 12.4–15.4)
PLATELET # BLD: 152 K/UL (ref 135–450)
PMV BLD AUTO: 11 FL (ref 5–10.5)
POTASSIUM SERPL-SCNC: 4.1 MMOL/L (ref 3.5–5.1)
RBC # BLD: 5.31 M/UL (ref 4.2–5.9)
SODIUM BLD-SCNC: 139 MMOL/L (ref 136–145)
WBC # BLD: 9.4 K/UL (ref 4–11)

## 2022-04-19 PROCEDURE — 76770 US EXAM ABDO BACK WALL COMP: CPT

## 2022-04-19 PROCEDURE — 6360000002 HC RX W HCPCS

## 2022-04-19 PROCEDURE — 80048 BASIC METABOLIC PNL TOTAL CA: CPT

## 2022-04-19 PROCEDURE — 85025 COMPLETE CBC W/AUTO DIFF WBC: CPT

## 2022-04-19 PROCEDURE — 6370000000 HC RX 637 (ALT 250 FOR IP)

## 2022-04-19 PROCEDURE — 93306 TTE W/DOPPLER COMPLETE: CPT

## 2022-04-19 PROCEDURE — 2580000003 HC RX 258

## 2022-04-19 PROCEDURE — 83735 ASSAY OF MAGNESIUM: CPT

## 2022-04-19 PROCEDURE — 36415 COLL VENOUS BLD VENIPUNCTURE: CPT

## 2022-04-19 RX ORDER — ASPIRIN 81 MG/1
81 TABLET ORAL DAILY
Qty: 30 TABLET | Refills: 3 | Status: SHIPPED | OUTPATIENT
Start: 2022-04-19

## 2022-04-19 RX ORDER — MECLIZINE HCL 12.5 MG/1
25 TABLET ORAL DAILY PRN
Qty: 5 TABLET | Refills: 0 | Status: SHIPPED | OUTPATIENT
Start: 2022-04-19

## 2022-04-19 RX ORDER — ROSUVASTATIN CALCIUM 10 MG/1
10 TABLET, COATED ORAL DAILY
Qty: 30 TABLET | Refills: 3 | Status: SHIPPED | OUTPATIENT
Start: 2022-04-19

## 2022-04-19 RX ORDER — CLOPIDOGREL BISULFATE 75 MG/1
75 TABLET ORAL DAILY
Qty: 30 TABLET | Refills: 3 | Status: SHIPPED | OUTPATIENT
Start: 2022-04-19

## 2022-04-19 RX ADMIN — ASPIRIN 81 MG: 81 TABLET, COATED ORAL at 09:52

## 2022-04-19 RX ADMIN — SODIUM CHLORIDE, PRESERVATIVE FREE 10 ML: 5 INJECTION INTRAVENOUS at 09:52

## 2022-04-19 RX ADMIN — NIFEDIPINE 90 MG: 30 TABLET, EXTENDED RELEASE ORAL at 09:52

## 2022-04-19 RX ADMIN — HEPARIN SODIUM 5000 UNITS: 5000 INJECTION INTRAVENOUS; SUBCUTANEOUS at 05:53

## 2022-04-19 ASSESSMENT — PAIN SCALES - GENERAL
PAINLEVEL_OUTOF10: 0

## 2022-04-19 NOTE — PROGRESS NOTES
Clinical Pharmacy Progress Note  Medication History         List of of current medications patient is taking is complete. Home Medication list in EPIC updated to reflect changes noted below. Source of information: Patient's wife & patient     Changes made to home medication list:   Medications removed (no longer taking):  Amlodipine  Aspirin  Atorvastatin  Clopidogrel  Lisinopril  Rosuvastatin      Medications added:   None     Medication doses / instructions adjusted:   None     Other notes:   Spoke with patient and his wife regarding current home medications. Approximately 4 weeks ago all medications were D/C'd except for nebivolol and nifedipine. However, patient's wife stated he has not taken nifedipine for about 4 weeks due to a misunderstanding and has only been taking nebivolol.      Ger Uribe, PharmD Candidate 2022 4/19/2022  1:35 PM      Current Outpatient Medications   Medication Instructions    nebivolol (BYSTOLIC) 10 mg, Oral, DAILY    NIFEdipine (ADALAT CC) 30 mg, Oral, 3 TIMES DAILY

## 2022-04-19 NOTE — PLAN OF CARE
D: No c/o dizziness amb to BR last pm. Negative orthostatic b/p. HR last pm dropped 48 & this am 38 briefly asymptomatic.    A: Cont to monitor during hourly rounds    Problem: Cardiac:  Goal: Ability to maintain vital signs within normal range will improve  Description: Ability to maintain vital signs within normal range will improve  Outcome: Ongoing  Goal: Cardiovascular alteration will improve  Description: Cardiovascular alteration will improve  Outcome: Ongoing

## 2022-04-19 NOTE — PROGRESS NOTES
HR drop to 35 briefly while awake in bed. Asymptomatic at time of event. Sinus bradycardia on telemetry. Dr. General Hayden aware.

## 2022-04-19 NOTE — DISCHARGE SUMMARY
INTERNAL MEDICINE DEPARTMENT AT [address] [place] [address]  DISCHARGE SUMMARY    Patient ID: Estuardo Azerbaijani                                             Discharge Date: 4/19/2022   Patient's PCP: Andrea Begum MD                                          Discharge Physician:   [facility] Date: 4/18/2022   Admitting Physician: Xiomy Bae MD    PROBLEMS DURING HOSPITALIZATION:  Present on Admission:   MAYUR (acute kidney injury) ([address]      DISCHARGE DIAGNOSES:  Vertigo  HTN  MAYUR  HLD    HPI:  Brent Pulido a 61 y. o. male with PMH as below notable for stroke, HTN, HLD, CKD stage 3 who presented with vertigo. On admission, patient was hypertensive (SBP 190s) and afebrile. Patient reports that he is regularly taking only 1 medication while multiple are listed in his chart. WBC was 9.7. EKG did not show any acute ischemic changes. CT head showed no acute intracranial abnormality. Similar findings present on previous exam from 2017. Meclizine was given and patient improved clinically. Creatinine was 1.8, baseline 1.1. Patient was admitted for further workup and management of Vertigo and MAYUR on CKD. The following issues were addressed during hospitalization:    Vertigo due to post viral infection labyrinthitis   Patient's vertigo resolved with meclizine x 1 dose in ED. Since then he has been asymptomatic. CT head and MRI head did not show any acute intracranial abnormalities. Patient very hypertensive with history of hypertensive strokes. Patient was placed on telemetry and echo showed . He will be discharged home on PRN meclizine.     HTN  On admission, SBP levels between 180-190s. Patient was only taking Nebivolol. Patient's Nebivolol was held and patient was started on nifedipine. SBP levels stabilized to the 120s-130s.       MAYUR on CKD   On admission Creatinine was 1.8 from a baseline of 1.1. Renal ultrasound shows no hydronephrosis, some renals cysts, and an enlarged prostate.  UA shows 100 mg/dL protein, 10-20 intracranial abnormality. 2. Extensive nonspecific periventricular white matter hypodensity compatible with chronic small vessel ischemic disease and/or age related degenerative change. 3. Bilateral thalamic hypodensities compatible with remote lacunar infarcts. 4. Large periapical lucency along the left maxillary incisor suggesting a large chronic periapical abscess.  Similar findings present on previous exam from 2017     Treatments: Meclizine  Disposition: home  Discharged Condition: Stable  Follow Up: Primary Care Physician in 1 week    DISCHARGE MEDICATION:       Medication List      START taking these medications    meclizine 12.5 MG tablet  Commonly known as: ANTIVERT  Take 2 tablets by mouth daily as needed for Dizziness        CONTINUE taking these medications    aspirin 81 MG EC tablet  Take 1 tablet by mouth daily  Notes to patient: Aspirin  USE--Prevents heart attack and stroke (decreases platelet adhesion)  SIDE EFFECTS-- Stomach upset, bleeding/bruising more easily     clopidogrel 75 MG tablet  Commonly known as: PLAVIX  Take 1 tablet by mouth daily  Notes to patient: Clopidogrel  (Plavix)  USE--prevent stroke and heart attacks, protects heart stents  SIDE EFFECTS--  Bleeding or bruising more easily  Refer to drug information handout     nebivolol 10 MG tablet  Commonly known as: BYSTOLIC     NIFEdipine 30 MG extended release tablet  Commonly known as: ADALAT CC     rosuvastatin 10 MG tablet  Commonly known as: CRESTOR  Take 1 tablet by mouth daily           Where to Get Your Medications      These medications were sent to [facility]  [id number],[place] [address] [phone] - F [phone]  [address], [facility] [address]    Phone: [phone]   · aspirin 81 MG EC tablet  · clopidogrel 75 MG tablet  · meclizine 12.5 MG tablet  · rosuvastatin 10 MG tablet          Activity: activity as tolerated  Diet: regular diet  Wound Care: none needed    Time Spent on discharge is more than 15 minutes    Signed:  Teetee Barreto MD, PGY-1  4/19/2022

## 2022-04-19 NOTE — PROGRESS NOTES
Progress Note    Admit Date: 4/18/2022  Day: 1  Diet: ADULT DIET; Regular; Low Sodium (2 gm)    CC: Dizziness and Nausea    Interval history:   Patient seen at bedside. He had an episode of asymptomatic bradycardia when drawing blood for labs. No vertigo or nausea. Denies chest pain, N/V, headache, BM, urination abnormalities, BM abnormalities, SOB, cough, fatigue, or appetite changes. HPI:   Italo Avina is a 61 y.o. male with PMH as below notable for stroke, HTN, HLD, CKD stage 3 who presented with vertigo. Patient reported that when he woke up this morning he had a sensation that his room was spinning. Patient reports that his vertigo gets worse with movement and get better with rest.  Patient has history of previous stroke the last one was on 2017. And also report some change in his blood pressure regimen a couple of weeks ago.     The patient denies any symptoms of neurological impairment or TIA's; no amaurosis, diplopia, dysphasia, or unilateral disturbance of motor or sensory function. No loss of balance or vertigo. The patient denies abdominal or flank pain, anorexia, nausea or vomiting, dysphagia, change in bowel habits or black or bloody stools or weight loss. Patient denies any exertional chest pain, dyspnea, palpitations, syncope, orthopnea, edema or paroxysmal nocturnal dyspnea. The patient denies dysuria, frequency or hematuria. Patient does not report any changes in his p.o. intake or decreased urine.     Medications:     Scheduled Meds:   aspirin  81 mg Oral Daily    atorvastatin  40 mg Oral Nightly    NIFEdipine  90 mg Oral Daily    sodium chloride flush  5-40 mL IntraVENous 2 times per day    heparin (porcine)  5,000 Units SubCUTAneous 3 times per day    [Held by provider] spironolactone  50 mg Oral Daily     Continuous Infusions:   sodium chloride       PRN Meds:sodium chloride flush, sodium chloride, polyethylene glycol, acetaminophen **OR** acetaminophen, promethazine **OR** ondansetron    Objective:   Vitals:   T-max:  Patient Vitals for the past 8 hrs:   BP Temp Temp src Pulse Resp SpO2   04/19/22 0758 126/75 97.8 °F (36.6 °C) Oral 58 18 97 %   04/19/22 0740 -- -- -- (!) 38 -- --   04/19/22 0548 123/75 98.3 °F (36.8 °C) Oral 63 20 97 %       Intake/Output Summary (Last 24 hours) at 4/19/2022 1118  Last data filed at 4/19/2022 0555  Gross per 24 hour   Intake 600 ml   Output 325 ml   Net 275 ml     Physical Exam  Constitutional:       General: He is not in acute distress. Appearance: Normal appearance. He is obese. He is not ill-appearing. HENT:      Head: Normocephalic and atraumatic. Nose: Nose normal. No congestion or rhinorrhea. Mouth/Throat:      Mouth: Mucous membranes are moist.      Pharynx: Oropharynx is clear. Eyes:      Extraocular Movements: Extraocular movements intact. Pupils: Pupils are equal, round, and reactive to light. Cardiovascular:      Rate and Rhythm: Normal rate and regular rhythm. Pulses: Normal pulses. Heart sounds: Normal heart sounds. Pulmonary:      Effort: Pulmonary effort is normal. No respiratory distress. Breath sounds: Normal breath sounds. No stridor. No wheezing, rhonchi or rales. Chest:      Chest wall: No tenderness. Abdominal:      Palpations: Abdomen is soft. Tenderness: There is no abdominal tenderness. Musculoskeletal:      Cervical back: Normal range of motion and neck supple. Skin:     General: Skin is warm and dry. Neurological:      General: No focal deficit present. Mental Status: He is alert and oriented to person, place, and time. Psychiatric:         Mood and Affect: Mood normal.         Behavior: Behavior normal.         Thought Content:  Thought content normal.         LABS:    CBC:   Recent Labs     04/18/22  1025 04/19/22  0746   WBC 9.7 9.4   HGB 14.5 15.3   HCT 43.8 45.5    152   MCV 86.4 85.7     Renal:    Recent Labs     04/18/22  1025 04/19/22  0746   NA Vertigo last >1 hour, unsteady walk. Resolved with meclizine x 1 dose, exacerbates with movement alleviates with rest. CT head and MRI head did not show any acute intracranial abnormalities. Patient very hypertensive with history of hypertensive strokes. - F/u echo  - Continue telemetry    HTN  On admission, SBP levels between 180-190s. Patient was only taking Nebivolol. Currently 126/75.   -F/u echo  -Continue home Nebivolol  -Continue home nifedipine      MAYUR, most likely CKD   Cr 1.7 trending down from 1.8, Baseline 1.1 in 2017, BUN 22 from 18.  -Renal Ultrasound shows no hydronephrosis, some renals cysts, and an enlarged prostate  -UA shows 100mg/dL protein, 10-20 WBC, and negative leukocyte esterace   -F/u urine creatinine  -F/u FeNa  -F/u microalbumin/creatinine  -monitor lytes replace as needed  -avoid Nephrotoxic meds  -monitor I/Os strictly     Stroke history   Patient reported multiple strokes in 2017. Patient was not taking statin or aspirin  -Start atorvastatin   -Start aspirin 81 mg      HLD  -Start atorvastatin      Code Status: Full code   FEN: Regular diet  PPX:  Heparin sc  DISPO: [facility]    Renanheron Causey, MS3  04/19/22  11:18 AM      Patient seen independently. Agree with the findings above.   Joselito Sclaes MD  PGY-1  This patient has been staffed and discussed with Everett Paulson MD.

## 2022-04-19 NOTE — PROGRESS NOTES
Pt discharged home to private residence with wife. IV removed with no complications. Heart monitor removed. D/c paperwork gone over with pt and wife. Medication regime and side effects explained. Verbalized understanding with no further questions. Aware of need to make f/u appointment with PCP. Left floor with all personal belongings to private vehicle.

## 2022-04-19 NOTE — PROGRESS NOTES
Pt seen, examined, d/w resident and medcal student. Agree with assessment and plan as docuemtned per medical student Hilda Causey, serving as scribe. Echo pending, home soon if echo unremarkable.      Alejandro Paredes MD

## 2022-04-19 NOTE — DISCHARGE SUMMARY
INTERNAL MEDICINE DEPARTMENT AT [address] [place] [address]  DISCHARGE SUMMARY    Patient ID: Lewis Hanley                                             Discharge Date: 4/19/2022   Patient's PCP: Adelso Gibbs MD                                          Discharge Physician:   [facility] Date: 4/18/2022   Admitting Physician: Susan Broderick MD    PROBLEMS DURING HOSPITALIZATION:  Present on Admission:   MAYUR (acute kidney injury) ([address]      DISCHARGE DIAGNOSES:  Vertigo  HTN  MAYUR  Stroke History  HLD    HPI:  Catherne Cluster a 61 y. o. male with PMH as below notable for stroke, HTN, HLD, CKD stage 3 who presented with vertigo. On admission, patient was hypertensive (SBP 190s) and afebrile. Patient reports that he is regularly taking only 1 medication while multiple are listed in his chart. WBC was 9.7. EKG did not show any acute ischemic changes. CT head showed no acute intracranial abnormality. Similar findings present on previous exam from 2017. Meclizine was given and patient improved clinically. Creatinine was 1.8, baseline 1.1. Patient was admitted for further workup and management of Vertigo and MAYUR on CKD. The following issues were addressed during hospitalization:    Vertigo due to post viral infection labyrinthitis   Patient's vertigo resolved with meclizine x 1 dose in ED. Since then he has been asymptomatic. CT head and MRI head did not show any acute intracranial abnormalities. Patient very hypertensive with history of hypertensive strokes. Patient was placed on telemetry and echo showed . He will be discharged home on PRN meclizine.     HTN  On admission, SBP levels between 180-190s. Patient was only taking Nebivolol. Patient's Nebivolol was held and patient was started on nifedipine. SBP levels stabilized to the 120s-130s.       MAYUR due to CKD   On admission Creatinine was 1.8 from a baseline of 1.1. Renal ultrasound shows no hydronephrosis, some renals cysts, and an enlarged prostate.  UA shows 100 mg/dL protein, 10-20 WBC, and negative leukocyte esterace. FeNa is 0.8% and microalbumin/creatinine ratio is      Stroke history   Patient reported multiple strokes in 2017. Patient was not taking statin or aspirin. Patient was started on aspirin 81 mg and atorvastatin.      HLD  Patient was started on atorvastatin. Physical Exam  Constitutional:       General: He is not in acute distress. Appearance: Normal appearance. He is obese. He is not ill-appearing. HENT:      Head: Normocephalic and atraumatic. Nose: Nose normal. No congestion or rhinorrhea. Mouth/Throat:      Mouth: Mucous membranes are moist.      Pharynx: Oropharynx is clear. Eyes:      Extraocular Movements: Extraocular movements intact. Pupils: Pupils are equal, round, and reactive to light. Cardiovascular:      Rate and Rhythm: Normal rate and regular rhythm. Pulses: Normal pulses. Heart sounds: Normal heart sounds. Pulmonary:      Effort: Pulmonary effort is normal. No respiratory distress. Breath sounds: Normal breath sounds. No stridor. No wheezing, rhonchi or rales. Chest:      Chest wall: No tenderness. Abdominal:      Palpations: Abdomen is soft. Tenderness: There is no abdominal tenderness. Musculoskeletal:      Cervical back: Normal range of motion and neck supple. Skin:     General: Skin is warm and dry. Neurological:      General: No focal deficit present. Mental Status: He is alert and oriented to person, place, and time. Psychiatric:         Mood and Affect: Mood normal.         Behavior: Behavior normal.         Thought Content: Thought content normal.     Consults: none  Significant Diagnostic Studies:   US RENAL COMPLETE   Final Result       1. No hydronephrosis. 2. Renal cysts. 3. Bladder thickening versus underdistention. 4. Enlarged prostate.                       MRI BRAIN WO CONTRAST   Final Result       1. No acute intracranial abnormality.      2. Extensive

## 2022-08-22 RX ORDER — ASPIRIN 81 MG/1
TABLET, COATED ORAL
Qty: 30 TABLET | Refills: 3 | OUTPATIENT
Start: 2022-08-22